# Patient Record
Sex: MALE | Race: WHITE | NOT HISPANIC OR LATINO
[De-identification: names, ages, dates, MRNs, and addresses within clinical notes are randomized per-mention and may not be internally consistent; named-entity substitution may affect disease eponyms.]

---

## 2022-03-17 PROBLEM — Z00.00 ENCOUNTER FOR PREVENTIVE HEALTH EXAMINATION: Status: ACTIVE | Noted: 2022-03-17

## 2022-04-14 ENCOUNTER — APPOINTMENT (OUTPATIENT)
Dept: OTOLARYNGOLOGY | Facility: CLINIC | Age: 47
End: 2022-04-14
Payer: COMMERCIAL

## 2022-04-14 VITALS
TEMPERATURE: 96.3 F | SYSTOLIC BLOOD PRESSURE: 100 MMHG | HEIGHT: 69 IN | BODY MASS INDEX: 25.92 KG/M2 | WEIGHT: 175 LBS | HEART RATE: 62 BPM | DIASTOLIC BLOOD PRESSURE: 69 MMHG

## 2022-04-14 PROCEDURE — 31231 NASAL ENDOSCOPY DX: CPT

## 2022-04-14 PROCEDURE — 99204 OFFICE O/P NEW MOD 45 MIN: CPT | Mod: 25

## 2022-04-14 PROCEDURE — 95004 PERQ TESTS W/ALRGNC XTRCS: CPT

## 2022-04-14 RX ORDER — FINASTERIDE 1 MG/1
1 TABLET ORAL
Refills: 0 | Status: ACTIVE | COMMUNITY

## 2022-04-14 RX ORDER — MELOXICAM 15 MG/1
TABLET ORAL
Refills: 0 | Status: ACTIVE | COMMUNITY

## 2022-04-14 NOTE — PHYSICAL EXAM
[Normal] : no abnormal secretions [de-identified] : + vidal- nasal valve collapse, + overprojection dorsally,

## 2022-04-14 NOTE — PROCEDURE
[FreeTextEntry6] : Procedure performed: Nasal Endoscopy- Diagnostic\par Pre-op indication(s): nasal congestion\par Post-op indication(s): nasal congestion \par Verbal and/or written consent obtained from patient\par Anterior rhinoscopy insufficient to account for symptoms\par Scope #: 3,  flexible fiber optic telescope \par The scope was introduced in the nasal passage between the middle and inferior turbinates to exam the inferior portion of the middle meatus and the fontanelle, as well as the maxillary ostia.  Next, the scope was passed medically and posteriorly to the middle turbinates to examine the sphenoethmoid recess and the superior turbinate region.\par Upon visualization the finders are as follows:\par Nasal Septum: sigmoidal septal deviation, + caudal deflection to the right, and posterior deflection L, + nasal valve collapse \par Bilateral - Mucosa: boggy turbinates, Mucous: scant, Polyp: not seen, Inferior Turbinate: boggy, Middle Turbinate: moderate BITH, Superior Turbinate: normal, Inferior Meatus: narrow, Middle Meatus: narrow, Super Meatus:normal, Sphenoethmoidal Recess: clear\par

## 2022-04-14 NOTE — END OF VISIT
[FreeTextEntry3] : I personally saw and examined ZAINA BERMUDEZ in detail. I spoke to LARRY Aceves regarding the assessment and plan of care.  I preformed the procedures and I reviewed the above assessment and plan of care, and agree. I have made changes in changes in the body of the note where appropriate.\par \par

## 2022-04-14 NOTE — ASSESSMENT
[FreeTextEntry1] : 47 y/o M with Nasal congestion with septal deviation and moderate bilateral turbinate hypertrophy. Also on exam caudal deflection, and nasal valve collapse. \par pt s/p medical management without any relief\par -  Flonase. A topical steroid reduce mucosal swelling, illustrated appropriate use and how to reduce the risk of bleeding \par - Nasal irrigation and showed how to use it to maximize effectiveness \par - Allergy test performed. Counseled patient at length on pathophysiology all allergies and techniques for avoidance. handouts given.\par Risks benefits and alternatives to septonasal reconstruction and bilateral inferior turbinate reduction discussed. Risks of bleeding, infection, septal hematoma, injury to the skull base, septal perforation results in whistling, permanent numbness in and around their nose, pain, discoloration or swelling that may persist, scarring crusting and bleeding as well as continued nasal obstruction, empty nose syndrome, cosmetic deformity, uneven-looking nose, collapse, scarring, synechiae, pollybeak deformity, rocker deformity, bone comminution, and need for revisionary surgery (patient explained that there is inherent revision rate to septorhinoplasty), osteitis, bleeding, infection, recurrent or persistent nasal deformity. Patient understood risks and would like to continue with the operation.\par will consider tongue groove, septal angle resection, dorsal reduction, tip looks ok - will see if can even out medial crura. caudal septum is into the right nasal valve and is narrow, will see if can remove and reconstruction valve area, may need strut, possible spreaders. some fullness right nasal bones - will even a it \par pics taken today \par will do simulation

## 2022-04-14 NOTE — HISTORY OF PRESENT ILLNESS
[de-identified] : 47 y/o M c/o constant b/l nasal congestion R>L x 30 years, pt states worse in the spring, and worse in the morning when he wakes up. \par pt has not been tested for allergies. \par Pt admits to trying nasal sprays, saline with temporary relief, and doesn’t resolve the sxs. \par + smell is okay not the best \par Pt denies sinus pressure, sinus infections, nasal surgeries

## 2022-06-13 ENCOUNTER — APPOINTMENT (OUTPATIENT)
Dept: OTOLARYNGOLOGY | Facility: CLINIC | Age: 47
End: 2022-06-13
Payer: COMMERCIAL

## 2022-06-13 VITALS
WEIGHT: 175 LBS | TEMPERATURE: 98.3 F | HEART RATE: 76 BPM | HEIGHT: 69 IN | DIASTOLIC BLOOD PRESSURE: 76 MMHG | SYSTOLIC BLOOD PRESSURE: 122 MMHG | BODY MASS INDEX: 25.92 KG/M2

## 2022-06-13 PROCEDURE — 31231 NASAL ENDOSCOPY DX: CPT

## 2022-06-13 PROCEDURE — 99214 OFFICE O/P EST MOD 30 MIN: CPT | Mod: 25

## 2022-06-13 NOTE — PHYSICAL EXAM
[Normal] : no abnormal secretions [de-identified] : + vidal- nasal valve collapse, + overprojection dorsally,

## 2022-06-13 NOTE — ASSESSMENT
[FreeTextEntry1] : 47 y/o M with Nasal congestion with septal deviation and moderate bilateral turbinate hypertrophy. Also on exam caudal deflection, and nasal valve collapse. \par pt s/p medical management without any relief\par -  Flonase. A topical steroid reduce mucosal swelling, illustrated appropriate use and how to reduce the risk of bleeding \par - Nasal irrigation and showed how to use it to maximize effectiveness \par - Allergy test performed. Counseled patient at length on pathophysiology all allergies and techniques for avoidance. handouts given.\par Risks benefits and alternatives to septonasal reconstruction and bilateral inferior turbinate reduction discussed. Risks of bleeding, infection, septal hematoma, injury to the skull base, septal perforation results in whistling, permanent numbness in and around their nose, pain, discoloration or swelling that may persist, scarring crusting and bleeding as well as continued nasal obstruction, empty nose syndrome, cosmetic deformity, uneven-looking nose, collapse, scarring, synechiae, pollybeak deformity, rocker deformity, bone comminution, and need for revisionary surgery (patient explained that there is inherent revision rate to septorhinoplasty), osteitis, bleeding, infection, recurrent or persistent nasal deformity. Patient understood risks and would like to continue with the operation.\par will consider tongue groove with mucosal resection, septal angle resection- and take off a bit of the caudal septum, dorsal reduction, tip looks ok - will see if can even out medial crura. caudal septum is into the right nasal valve and is narrow, will see if can remove and reconstruction valve area, may need strut, possible spreaders. some fullness right nasal bones - will even a it \par Simulation reviewed today

## 2022-06-13 NOTE — CONSULT LETTER
[FreeTextEntry1] : Dear Dr. MELLY POLLARD \par I had the pleasure of evaluating your patient ZAINA BERMUDEZ, thank you for allowing us to participate in their care. please see full note detailing our visit below.\par If you have any questions, please do not hesitate to call me and I would be happy to discuss further. \par \par Armand Chambers M.D.\par Attending Physician,  \par Department of Otolaryngology - Head and Neck Surgery\par [facility] \par Office: [phone]\par Fax: [phone]\par \par

## 2022-06-13 NOTE — HISTORY OF PRESENT ILLNESS
[de-identified] : 45 y/o M c/o constant b/l nasal congestion R>L x 30 years, pt states worse in the spring, and worse in the morning when he wakes up. \par pt has not been tested for allergies. \par Pt admits to trying nasal sprays, saline with temporary relief, and doesn’t resolve the sxs. \par + smell is okay not the best \par Pt denies sinus pressure, sinus infections, nasal surgeries  [FreeTextEntry1] : Pt here for a follow up regarding nasal congestion b/l, constant. Pt states stable since last visit, and with no improvement. Pt also has been using nasal sprays with no relief.\par has been tested for allergies

## 2022-06-22 ENCOUNTER — OUTPATIENT (OUTPATIENT)
Dept: OUTPATIENT SERVICES | Facility: HOSPITAL | Age: 47
LOS: 1 days | End: 2022-06-22
Payer: COMMERCIAL

## 2022-06-22 VITALS
HEART RATE: 76 BPM | HEIGHT: 69 IN | SYSTOLIC BLOOD PRESSURE: 112 MMHG | DIASTOLIC BLOOD PRESSURE: 81 MMHG | OXYGEN SATURATION: 99 % | TEMPERATURE: 99 F | RESPIRATION RATE: 16 BRPM | WEIGHT: 177.91 LBS

## 2022-06-22 DIAGNOSIS — Z98.890 OTHER SPECIFIED POSTPROCEDURAL STATES: Chronic | ICD-10-CM

## 2022-06-22 DIAGNOSIS — Z01.818 ENCOUNTER FOR OTHER PREPROCEDURAL EXAMINATION: ICD-10-CM

## 2022-06-22 DIAGNOSIS — J34.2 DEVIATED NASAL SEPTUM: ICD-10-CM

## 2022-06-22 DIAGNOSIS — J30.2 OTHER SEASONAL ALLERGIC RHINITIS: ICD-10-CM

## 2022-06-22 LAB
ANION GAP SERPL CALC-SCNC: 10 MMOL/L — SIGNIFICANT CHANGE UP (ref 5–17)
BUN SERPL-MCNC: 14 MG/DL — SIGNIFICANT CHANGE UP (ref 7–23)
CALCIUM SERPL-MCNC: 9.9 MG/DL — SIGNIFICANT CHANGE UP (ref 8.4–10.5)
CHLORIDE SERPL-SCNC: 100 MMOL/L — SIGNIFICANT CHANGE UP (ref 96–108)
CO2 SERPL-SCNC: 27 MMOL/L — SIGNIFICANT CHANGE UP (ref 22–31)
CREAT SERPL-MCNC: 0.96 MG/DL — SIGNIFICANT CHANGE UP (ref 0.5–1.3)
EGFR: 99 ML/MIN/1.73M2 — SIGNIFICANT CHANGE UP
GLUCOSE SERPL-MCNC: 84 MG/DL — SIGNIFICANT CHANGE UP (ref 70–99)
HCT VFR BLD CALC: 50.5 % — HIGH (ref 39–50)
HGB BLD-MCNC: 16.5 G/DL — SIGNIFICANT CHANGE UP (ref 13–17)
HIV 1+2 AB+HIV1 P24 AG SERPL QL IA: SIGNIFICANT CHANGE UP
MCHC RBC-ENTMCNC: 27.9 PG — SIGNIFICANT CHANGE UP (ref 27–34)
MCHC RBC-ENTMCNC: 32.7 GM/DL — SIGNIFICANT CHANGE UP (ref 32–36)
MCV RBC AUTO: 85.4 FL — SIGNIFICANT CHANGE UP (ref 80–100)
NRBC # BLD: 0 /100 WBCS — SIGNIFICANT CHANGE UP (ref 0–0)
PLATELET # BLD AUTO: 238 K/UL — SIGNIFICANT CHANGE UP (ref 150–400)
POTASSIUM SERPL-MCNC: 5.3 MMOL/L — SIGNIFICANT CHANGE UP (ref 3.5–5.3)
POTASSIUM SERPL-SCNC: 5.3 MMOL/L — SIGNIFICANT CHANGE UP (ref 3.5–5.3)
RBC # BLD: 5.91 M/UL — HIGH (ref 4.2–5.8)
RBC # FLD: 13.4 % — SIGNIFICANT CHANGE UP (ref 10.3–14.5)
SODIUM SERPL-SCNC: 137 MMOL/L — SIGNIFICANT CHANGE UP (ref 135–145)
WBC # BLD: 9.39 K/UL — SIGNIFICANT CHANGE UP (ref 3.8–10.5)
WBC # FLD AUTO: 9.39 K/UL — SIGNIFICANT CHANGE UP (ref 3.8–10.5)

## 2022-06-22 PROCEDURE — 85027 COMPLETE CBC AUTOMATED: CPT

## 2022-06-22 PROCEDURE — 80048 BASIC METABOLIC PNL TOTAL CA: CPT

## 2022-06-22 PROCEDURE — G0463: CPT

## 2022-06-22 PROCEDURE — 87389 HIV-1 AG W/HIV-1&-2 AB AG IA: CPT

## 2022-06-22 RX ORDER — SODIUM CHLORIDE 9 MG/ML
1000 INJECTION, SOLUTION INTRAVENOUS ONCE
Refills: 0 | Status: DISCONTINUED | OUTPATIENT
Start: 2022-07-13 | End: 2022-07-27

## 2022-06-22 NOTE — H&P PST ADULT - HISTORY OF PRESENT ILLNESS
47 y/o male c/o constant b/l nasal congestion R>L x 30 years, pt states worse in the spring. Pt states he  tried nasal sprays, saline with temporary relief, and doesn’t resolve the symptoms. Denies sinus pressure, sinus infections and nasal surgeries. Today he presents to PST for scheduled Septoplasty Shay Inferior Turbinoplasty Nasal Valve Repair on 7/13/22. Denies any palpitations, SOB, N/V, fever or chills.   ***COVID swab scheduled for 7/10/22 ***

## 2022-06-22 NOTE — H&P PST ADULT - NSICDXPASTMEDICALHX_GEN_ALL_CORE_FT
PAST MEDICAL HISTORY:  2019 novel coronavirus disease (COVID-19) 4/2022 mild symptoms    Marijuana user 2 x a week    Nasal septal deviation     Nasal turbinate hypertrophy     OA (osteoarthritis)

## 2022-06-22 NOTE — H&P PST ADULT - NSICDXPASTSURGICALHX_GEN_ALL_CORE_FT
PAST SURGICAL HISTORY:  H/O cervical discectomy 2015 with fusion    S/P foot surgery 2018 Right h/o plantar fasciitis

## 2022-06-22 NOTE — H&P PST ADULT - PROBLEM SELECTOR PLAN 1
Septoplasty Shay Inferior Turbinoplasty Nasal Valve Repair on 7/13/22  Pre-op education provided - all questions answered. Pt verbalized understanding

## 2022-06-22 NOTE — H&P PST ADULT - FALL HARM RISK - UNIVERSAL INTERVENTIONS
Bed in lowest position, wheels locked, appropriate side rails in place/Call bell, personal items and telephone in reach/Instruct patient to call for assistance before getting out of bed or chair/Non-slip footwear when patient is out of bed/[place] to call system/Physically safe environment - no spills, clutter or unnecessary equipment/Purposeful Proactive Rounding/Room/bathroom lighting operational, light cord in reach

## 2022-06-27 ENCOUNTER — TRANSCRIPTION ENCOUNTER (OUTPATIENT)
Age: 47
End: 2022-06-27

## 2022-06-28 PROBLEM — F12.90 CANNABIS USE, UNSPECIFIED, UNCOMPLICATED: Chronic | Status: ACTIVE | Noted: 2022-06-22

## 2022-06-28 PROBLEM — M19.90 UNSPECIFIED OSTEOARTHRITIS, UNSPECIFIED SITE: Chronic | Status: ACTIVE | Noted: 2022-06-22

## 2022-06-28 PROBLEM — J34.3 HYPERTROPHY OF NASAL TURBINATES: Chronic | Status: ACTIVE | Noted: 2022-06-22

## 2022-06-28 PROBLEM — J34.2 DEVIATED NASAL SEPTUM: Chronic | Status: ACTIVE | Noted: 2022-06-22

## 2022-06-28 PROBLEM — U07.1 COVID-19: Chronic | Status: ACTIVE | Noted: 2022-06-22

## 2022-07-10 ENCOUNTER — OUTPATIENT (OUTPATIENT)
Dept: OUTPATIENT SERVICES | Facility: HOSPITAL | Age: 47
LOS: 1 days | End: 2022-07-10
Payer: COMMERCIAL

## 2022-07-10 DIAGNOSIS — Z11.52 ENCOUNTER FOR SCREENING FOR COVID-19: ICD-10-CM

## 2022-07-10 DIAGNOSIS — Z98.890 OTHER SPECIFIED POSTPROCEDURAL STATES: Chronic | ICD-10-CM

## 2022-07-10 LAB — SARS-COV-2 RNA SPEC QL NAA+PROBE: SIGNIFICANT CHANGE UP

## 2022-07-10 PROCEDURE — U0005: CPT

## 2022-07-10 PROCEDURE — U0003: CPT

## 2022-07-10 PROCEDURE — C9803: CPT

## 2022-07-12 ENCOUNTER — TRANSCRIPTION ENCOUNTER (OUTPATIENT)
Age: 47
End: 2022-07-12

## 2022-07-13 ENCOUNTER — TRANSCRIPTION ENCOUNTER (OUTPATIENT)
Age: 47
End: 2022-07-13

## 2022-07-13 ENCOUNTER — OUTPATIENT (OUTPATIENT)
Dept: OUTPATIENT SERVICES | Facility: HOSPITAL | Age: 47
LOS: 1 days | End: 2022-07-13
Payer: COMMERCIAL

## 2022-07-13 ENCOUNTER — RESULT REVIEW (OUTPATIENT)
Age: 47
End: 2022-07-13

## 2022-07-13 ENCOUNTER — APPOINTMENT (OUTPATIENT)
Dept: OTOLARYNGOLOGY | Facility: HOSPITAL | Age: 47
End: 2022-07-13

## 2022-07-13 VITALS
DIASTOLIC BLOOD PRESSURE: 70 MMHG | OXYGEN SATURATION: 98 % | HEART RATE: 66 BPM | RESPIRATION RATE: 18 BRPM | HEIGHT: 69 IN | SYSTOLIC BLOOD PRESSURE: 116 MMHG | TEMPERATURE: 98 F | WEIGHT: 177.91 LBS

## 2022-07-13 VITALS
HEART RATE: 76 BPM | OXYGEN SATURATION: 97 % | SYSTOLIC BLOOD PRESSURE: 136 MMHG | DIASTOLIC BLOOD PRESSURE: 79 MMHG | RESPIRATION RATE: 14 BRPM

## 2022-07-13 DIAGNOSIS — Z98.890 OTHER SPECIFIED POSTPROCEDURAL STATES: Chronic | ICD-10-CM

## 2022-07-13 DIAGNOSIS — J30.2 OTHER SEASONAL ALLERGIC RHINITIS: ICD-10-CM

## 2022-07-13 PROCEDURE — 88300 SURGICAL PATH GROSS: CPT | Mod: 26

## 2022-07-13 PROCEDURE — 88300 SURGICAL PATH GROSS: CPT

## 2022-07-13 PROCEDURE — 30520 REPAIR OF NASAL SEPTUM: CPT | Mod: 58

## 2022-07-13 PROCEDURE — 30930 THER FX NASAL INF TURBINATE: CPT

## 2022-07-13 PROCEDURE — 30465 REPAIR NASAL STENOSIS: CPT

## 2022-07-13 PROCEDURE — C9399: CPT

## 2022-07-13 PROCEDURE — 20912 REMOVE CARTILAGE FOR GRAFT: CPT | Mod: 58,59

## 2022-07-13 PROCEDURE — 30140 RESECT INFERIOR TURBINATE: CPT | Mod: 50,58

## 2022-07-13 PROCEDURE — 30520 REPAIR OF NASAL SEPTUM: CPT

## 2022-07-13 PROCEDURE — 30465 REPAIR NASAL STENOSIS: CPT | Mod: 58

## 2022-07-13 RX ORDER — LIDOCAINE HCL 20 MG/ML
0.2 VIAL (ML) INJECTION ONCE
Refills: 0 | Status: DISCONTINUED | OUTPATIENT
Start: 2022-07-13 | End: 2022-07-13

## 2022-07-13 RX ORDER — OXYCODONE HYDROCHLORIDE 5 MG/1
5 TABLET ORAL ONCE
Refills: 0 | Status: DISCONTINUED | OUTPATIENT
Start: 2022-07-13 | End: 2022-07-13

## 2022-07-13 RX ORDER — SODIUM CHLORIDE 9 MG/ML
1000 INJECTION INTRAMUSCULAR; INTRAVENOUS; SUBCUTANEOUS
Refills: 0 | Status: DISCONTINUED | OUTPATIENT
Start: 2022-07-13 | End: 2022-07-27

## 2022-07-13 RX ORDER — SODIUM CHLORIDE 9 MG/ML
3 INJECTION INTRAMUSCULAR; INTRAVENOUS; SUBCUTANEOUS EVERY 8 HOURS
Refills: 0 | Status: DISCONTINUED | OUTPATIENT
Start: 2022-07-13 | End: 2022-07-13

## 2022-07-13 RX ORDER — FINASTERIDE 5 MG/1
1 TABLET, FILM COATED ORAL
Qty: 0 | Refills: 0 | DISCHARGE

## 2022-07-13 RX ORDER — FENTANYL CITRATE 50 UG/ML
12.5 INJECTION INTRAVENOUS
Refills: 0 | Status: DISCONTINUED | OUTPATIENT
Start: 2022-07-13 | End: 2022-07-13

## 2022-07-13 RX ORDER — ONDANSETRON 8 MG/1
4 TABLET, FILM COATED ORAL ONCE
Refills: 0 | Status: DISCONTINUED | OUTPATIENT
Start: 2022-07-13 | End: 2022-07-13

## 2022-07-13 RX ORDER — FLUTICASONE PROPIONATE 50 MCG
1 SPRAY, SUSPENSION NASAL
Qty: 15.8 | Refills: 0
Start: 2022-07-13 | End: 2022-07-26

## 2022-07-13 RX ADMIN — FENTANYL CITRATE 12.5 MICROGRAM(S): 50 INJECTION INTRAVENOUS at 16:44

## 2022-07-13 RX ADMIN — FENTANYL CITRATE 12.5 MICROGRAM(S): 50 INJECTION INTRAVENOUS at 15:22

## 2022-07-13 RX ADMIN — FENTANYL CITRATE 12.5 MICROGRAM(S): 50 INJECTION INTRAVENOUS at 17:15

## 2022-07-13 RX ADMIN — SODIUM CHLORIDE 75 MILLILITER(S): 9 INJECTION INTRAMUSCULAR; INTRAVENOUS; SUBCUTANEOUS at 16:00

## 2022-07-13 RX ADMIN — FENTANYL CITRATE 12.5 MICROGRAM(S): 50 INJECTION INTRAVENOUS at 15:52

## 2022-07-13 NOTE — ASU DISCHARGE PLAN (ADULT/PEDIATRIC) - NS MD DC FALL RISK RISK
For information on Fall & Injury Prevention, visit: [email or web address] OR  [email or web address] OR  https://www.cdc.gov/steadi/patient.html

## 2022-07-13 NOTE — ASU DISCHARGE PLAN (ADULT/PEDIATRIC) - CARE PROVIDER_API CALL
Armand Chambers (MD)  Otolaryngology  [address] [place] [address], [facility] [address]  [place] [address]  Phone: [phone]  Fax: [phone]  Follow Up Time:

## 2022-07-13 NOTE — ASU DISCHARGE PLAN (ADULT/PEDIATRIC) - ASU DC SPECIAL INSTRUCTIONSFT
- No nose blowing for 2 weeks, cough/sneeze through an open mouth   - No straining for two weeks  - Complete antibiotics as prescribed  - Pain medication as needed - do not drive, make decisions or operate machinery on pain meds. if constipates take stool softener, do not strain.   - start using nasal wash tomorrow, 2-3 times a day if possible  - follow up with Dr. Chambers in 1-2 weeks  - expect some bloody oozing from the nose for the first week, put a gauze under to catch it. May use afrin if needed - No nose blowing for 2 weeks, cough/sneeze through an open mouth   - No straining for two weeks  - Complete antibiotics as prescribed  - Pain medication as needed - do not drive, make decisions or operate machinery on pain meds. if constipates take stool softener, do not strain.   - may take ibuprofen or tylenol as needed - not with Percocet   - start using nasal wash tomorrow, 2-3 times a day if possible  - follow up with Dr. Chambers in 1-2 weeks  - expect some bloody oozing from the nose for the first week, put a gauze under to catch it. May use afrin if needed

## 2022-07-13 NOTE — ASU PATIENT PROFILE, ADULT - FALL HARM RISK - UNIVERSAL INTERVENTIONS
Bed in lowest position, wheels locked, appropriate side rails in place/Call bell, personal items and telephone in reach/Instruct patient to call for assistance before getting out of bed or chair/Non-slip footwear when patient is out of bed/[place] to call system/Physically safe environment - no spills, clutter or unnecessary equipment/Purposeful Proactive Rounding/Room/bathroom lighting operational, light cord in reach

## 2022-07-13 NOTE — PRE-ANESTHESIA EVALUATION ADULT - NSANTHPMHFT_GEN_ALL_CORE
46M hx osteoarthritis, nasal turbinate hypertrophy and marijuana use, PSH 2015 cervical discectomy w/ fusion and 2018 foot surgery p/w seasonal allergic rhinitis, here for septoplasty, bilateral inferior turbinoplasty, nasal valve repair.

## 2022-07-14 RX ORDER — AMOXICILLIN AND CLAVULANATE POTASSIUM 400; 57 MG/5ML; MG/5ML
400-57 POWDER, FOR SUSPENSION ORAL
Qty: 2.5 | Refills: 0 | Status: ACTIVE | COMMUNITY
Start: 2022-07-14 | End: 1900-01-01

## 2022-07-18 ENCOUNTER — APPOINTMENT (OUTPATIENT)
Dept: OTOLARYNGOLOGY | Facility: CLINIC | Age: 47
End: 2022-07-18

## 2022-07-18 VITALS
WEIGHT: 170 LBS | HEIGHT: 69 IN | TEMPERATURE: 97.6 F | SYSTOLIC BLOOD PRESSURE: 116 MMHG | BODY MASS INDEX: 25.18 KG/M2 | HEART RATE: 88 BPM | DIASTOLIC BLOOD PRESSURE: 86 MMHG

## 2022-07-18 PROCEDURE — 99024 POSTOP FOLLOW-UP VISIT: CPT

## 2022-07-18 PROCEDURE — 31237 NSL/SINS NDSC SURG BX POLYPC: CPT | Mod: 50,58

## 2022-07-18 RX ORDER — OXYCODONE AND ACETAMINOPHEN 5; 325 MG/1; MG/1
5-325 TABLET ORAL
Qty: 18 | Refills: 0 | Status: ACTIVE | COMMUNITY
Start: 2022-07-13

## 2022-07-18 RX ORDER — AMOXICILLIN AND CLAVULANATE POTASSIUM 875; 125 MG/1; MG/1
875-125 TABLET, COATED ORAL
Qty: 20 | Refills: 0 | Status: ACTIVE | COMMUNITY
Start: 2022-07-13

## 2022-07-18 NOTE — REASON FOR VISIT
[Post-Operative Visit] : a post-operative visit [FreeTextEntry2] : s/p septoplasty, bilateral inferior turbinoplasty, nasal valve repair 07/13/22

## 2022-07-18 NOTE — ASSESSMENT
[FreeTextEntry1] : 45 y/o M with Nasal congestion with septal deviation and moderate bilateral turbinate hypertrophy. Also on exam caudal deflection, and nasal valve collapse. \par pt s/p medical management without any relief\par Pt now s/p septoplasty, bilateral inferior turbinoplasty, nasal valve repair 07/13/22, debrided today, splints still in place, will remove next visit\par The patient was debrided bilaterally with rigid suction as a result of nasal crusting. breathing much improved after detriment. Patient should continue to avoid nose blowing, heavy lifting or exercising, and should start a regimen of over the counter nasal saline spray for moisturization of the nasal cavities\par will follow up to assure no scarring and keep sinuses open\par - Continue peroxide and nasal irrigations

## 2022-07-18 NOTE — HISTORY OF PRESENT ILLNESS
[de-identified] : 46 year old s/p septoplasty, bilateral inferior turbinoplasty, nasal valve repair 07/13/22\par Presents today for post operative evaluation.\par Today with swelling,and bruising.\par Reports small amount of blood and congestion.\par Denies fever, clear rhinorrhea and pain.\par Taking antibiotics BID. No longer needs Percocet. \par Using peroxide and irrigation 5+ times daily.

## 2022-07-18 NOTE — PHYSICAL EXAM
[Normal] : inferior turbinates and middle turbinates are normal [de-identified] : splints in place [de-identified] : crusting

## 2022-07-18 NOTE — PROCEDURE
[FreeTextEntry3] : procedure - bilateral endoscopic nasal  debridement\par Bilateral nasal cavities inspected with #0 ridged sinus endoscope. septum appeared midline and turbinates well reduced. bilateral middle meatuses were explored and suction and agitator were used to open ostiums and open any forming scar bands. all sinuses were explored and open at end of procedure\par nasal crusting cleared, splints in place

## 2022-07-21 ENCOUNTER — APPOINTMENT (OUTPATIENT)
Dept: OTOLARYNGOLOGY | Facility: CLINIC | Age: 47
End: 2022-07-21

## 2022-07-21 VITALS
TEMPERATURE: 97.9 F | WEIGHT: 170 LBS | HEIGHT: 69 IN | BODY MASS INDEX: 25.18 KG/M2 | SYSTOLIC BLOOD PRESSURE: 118 MMHG | DIASTOLIC BLOOD PRESSURE: 85 MMHG | HEART RATE: 98 BPM

## 2022-07-21 PROCEDURE — 99024 POSTOP FOLLOW-UP VISIT: CPT

## 2022-07-21 PROCEDURE — 31237 NSL/SINS NDSC SURG BX POLYPC: CPT | Mod: 50,58

## 2022-07-21 NOTE — END OF VISIT
[FreeTextEntry3] : I personally saw and examined JASON TSAI in detail. I spoke to Debbie ANDERSON regarding the assessment and plan of care.  I preformed the procedures and I reviewed the above assessment and plan of care, and agree. I have made changes in changes in the body of the note where appropriate.\par

## 2022-07-21 NOTE — ASSESSMENT
[FreeTextEntry1] : 46 year old with long history of b/l nasal congestion with septal deviation and moderate bilateral turbinate hypertrophy. Also on exam caudal deflection, and nasal valve collapse. \par Now s/p septoplasty, bilateral inferior turbinoplasty, nasal valve repair 07/13/22.\par The patient was debrided bilaterally with rigid suction as a result of nasal crusting. breathing much improved after detriment. Patient should continue to avoid nose blowing, heavy lifting or exercising, and should start a regimen of over the counter nasal saline spray for moisturization of the nasal cavities. suture and splints removed with improved breathing. Patient happy with results thus far \par will follow up to assure no scarring and keep sinuses open\par - Continue  nasal irrigations \par - flonase\par - nasal precautions

## 2022-07-21 NOTE — PHYSICAL EXAM
[Normal] : inferior turbinates and middle turbinates are normal [de-identified] : splints in plac [de-identified] : crusting

## 2022-07-21 NOTE — CONSULT LETTER
[FreeTextEntry1] : Dear Dr. MELLY POLLARD \par I had the pleasure of evaluating your patient ZAINA BERMUDEZ, thank you for allowing us to participate in their care. please see full note detailing our visit below.\par If you have any questions, please do not hesitate to call me and I would be happy to discuss further. \par \par Armand Chambers M.D.\par Attending Physician,  \par Department of Otolaryngology - Head and Neck Surgery\par [facility] \par Office: [phone]\par Fax: [phone]\par

## 2022-07-21 NOTE — HISTORY OF PRESENT ILLNESS
[de-identified] : 46 year old s/p septoplasty, bilateral inferior turbinoplasty, nasal valve repair 07/13/22\par Presents today for post operative evaluation.\par s/p debridement with improved breathing 07/18/22.\par Today splints in place still with mild swelling,and bruising.\par Denies fever, clear rhinorrhea and pain.\par Using peroxide and irrigation 5+ times daily.

## 2022-07-21 NOTE — PROCEDURE
[FreeTextEntry3] : procedure - bilateral endoscopic nasal debridement\par Bilateral nasal cavities inspected with #0 ridged sinus endoscope. septum appeared midline and turbinates well reduced. bilateral middle meatuses were explored and suction and agitator were used to open ostiums and open any forming scar bands. all sinuses were explored and open at end of procedure\par nasal crusting cleared, suture and splints removed

## 2022-08-11 ENCOUNTER — APPOINTMENT (OUTPATIENT)
Dept: OTOLARYNGOLOGY | Facility: CLINIC | Age: 47
End: 2022-08-11

## 2022-08-11 VITALS
DIASTOLIC BLOOD PRESSURE: 79 MMHG | TEMPERATURE: 98.5 F | WEIGHT: 170 LBS | SYSTOLIC BLOOD PRESSURE: 114 MMHG | HEIGHT: 69 IN | HEART RATE: 71 BPM | BODY MASS INDEX: 25.18 KG/M2

## 2022-08-11 DIAGNOSIS — J34.3 HYPERTROPHY OF NASAL TURBINATES: ICD-10-CM

## 2022-08-11 DIAGNOSIS — J34.89 OTHER SPECIFIED DISORDERS OF NOSE AND NASAL SINUSES: ICD-10-CM

## 2022-08-11 PROCEDURE — 99024 POSTOP FOLLOW-UP VISIT: CPT

## 2022-08-11 PROCEDURE — 31237 NSL/SINS NDSC SURG BX POLYPC: CPT | Mod: 50,58

## 2022-08-11 NOTE — REASON FOR VISIT
[Subsequent Evaluation] : a subsequent evaluation for [FreeTextEntry2] : s/p septoplasty, bilateral inferior turbinoplasty, nasal valve repair, rhino  07/13/22

## 2022-08-11 NOTE — CONSULT LETTER
[FreeTextEntry1] : Dear Dr. MELLY POLLARD \par I had the pleasure of evaluating your patient ZAINA BERMUDEZ, thank you for allowing us to participate in their care. please see full note detailing our visit below.\par If you have any questions, please do not hesitate to call me and I would be happy to discuss further. \par \par Armand Chambers M.D.\par Attending Physician,  \par Department of Otolaryngology - Head and Neck Surgery\par [facility] \par Office: [phone]\par Fax: [phone]\par \par

## 2022-08-11 NOTE — PHYSICAL EXAM
[Nasal Endoscopy Performed] : nasal endoscopy was performed, see procedure section for findings [Normal] : inferior turbinates and middle turbinates are normal

## 2022-08-11 NOTE — END OF VISIT
[FreeTextEntry3] : I personally saw and examined ZAINA BERMUDEZ in detail.  I spoke to RDAHA Quinones regarding the assessment and plan of care. I performed the procedures and relevant physical exam.  I have reviewed the above assessment and plan of care and I agree.  I have made changes to the body of the note wherever necessary and appropriate.\par

## 2022-08-11 NOTE — PROCEDURE
[FreeTextEntry3] : procedure - bilateral endoscopic nasal debridement\par Bilateral nasal cavities inspected with #0 ridged sinus endoscope. septum appeared midline and turbinates well reduced. bilateral middle meatuses were explored and suction and agitator were used to open ostiums and open any forming scar bands. all sinuses were explored and open at end of procedure\par nasal crusting cleared

## 2022-08-11 NOTE — HISTORY OF PRESENT ILLNESS
[de-identified] : 46 year old s/p septoplasty, bilateral inferior turbinoplasty, nasal valve repair, rhino  07/13/22\par Presents today for post operative evaluation.\par s/p debridement with improved breathing 07/18/22.\par  [FreeTextEntry1] : pt doing very well today, breathing well bilaterally using saline nasal rinse daily and peroxide on qtips, pt feels the swelling has gone down, does feel swelling over the nasal valve area b/l

## 2022-08-11 NOTE — ASSESSMENT
[FreeTextEntry1] : 46 year old with long history of b/l nasal congestion with septal deviation and moderate bilateral turbinate hypertrophy. Also on exam caudal deflection, and nasal valve collapse. \par Now s/p septoplasty, bilateral inferior turbinoplasty, nasal valve repair, Rhino 07/13/22.\par The patient was debrided bilaterally with rigid suction as a result of nasal crusting. breathing much improved after detriment. Patient should continue to avoid nose blowing, heavy lifting or exercising, and should start a regimen of over the counter nasal saline spray for moisturization of the nasal cavities. suture and splints removed with improved breathing. Patient happy with results thus far \par - Continue  nasal irrigations \par - flonase\par - nasal precautions \par very very happy with result of surgery - breathing and appearance \par \par \par \par

## 2022-09-12 NOTE — CONSULT LETTER
[FreeTextEntry1] : Dear Dr. MELLY POLLARD \par I had the pleasure of evaluating your patient ZAINA BERMUDEZ, thank you for allowing us to participate in their care. please see full note detailing our visit below.\par If you have any questions, please do not hesitate to call me and I would be happy to discuss further. \par \par Armand Chambers M.D.\par Attending Physician,  \par Department of Otolaryngology - Head and Neck Surgery\par [facility] \par Office: [phone]\par Fax: [phone]\par  
Attending with

## 2022-09-26 LAB — SURGICAL PATHOLOGY STUDY: SIGNIFICANT CHANGE UP

## 2022-09-27 ENCOUNTER — NON-APPOINTMENT (OUTPATIENT)
Age: 47
End: 2022-09-27

## 2022-10-03 ENCOUNTER — APPOINTMENT (OUTPATIENT)
Dept: OTOLARYNGOLOGY | Facility: CLINIC | Age: 47
End: 2022-10-03

## 2022-10-03 VITALS
WEIGHT: 175 LBS | HEIGHT: 69 IN | DIASTOLIC BLOOD PRESSURE: 82 MMHG | HEART RATE: 62 BPM | BODY MASS INDEX: 25.92 KG/M2 | SYSTOLIC BLOOD PRESSURE: 114 MMHG | TEMPERATURE: 97.9 F

## 2022-10-03 DIAGNOSIS — R09.81 NASAL CONGESTION: ICD-10-CM

## 2022-10-03 DIAGNOSIS — J34.2 DEVIATED NASAL SEPTUM: ICD-10-CM

## 2022-10-03 DIAGNOSIS — J30.2 OTHER SEASONAL ALLERGIC RHINITIS: ICD-10-CM

## 2022-10-03 DIAGNOSIS — R06.83 SNORING: ICD-10-CM

## 2022-10-03 DIAGNOSIS — Z98.890 OTHER SPECIFIED POSTPROCEDURAL STATES: ICD-10-CM

## 2022-10-03 PROCEDURE — 99213 OFFICE O/P EST LOW 20 MIN: CPT | Mod: 25

## 2022-10-03 PROCEDURE — 31231 NASAL ENDOSCOPY DX: CPT | Mod: 58

## 2022-10-03 NOTE — END OF VISIT
[FreeTextEntry3] : I personally saw and examined ZAINA BERMUDEZ in detail.  I spoke to RADHA Quinones regarding the assessment and plan of care. I performed the procedures and relevant physical exam.  I have reviewed the above assessment and plan of care and I agree.  I have made changes to the body of the note wherever necessary and appropriate.\par

## 2022-10-03 NOTE — CONSULT LETTER
[FreeTextEntry1] : Dear Dr. MELLY POLLARD \par I had the pleasure of evaluating your patient ZAINA BERMUDEZ, thank you for allowing us to participate in their care. please see full note detailing our visit below.\par If you have any questions, please do not hesitate to call me and I would be happy to discuss further. \par \par Armand Chambers M.D.\par Attending Physician,  \par Department of Otolaryngology - Head and Neck Surgery\par [facility] \par Office: [phone]\par Fax: [phone]\par \par

## 2022-10-03 NOTE — ASSESSMENT
[FreeTextEntry1] : Mr. BERMUDEZ is a 46 year male s/p septoplasty, bilateral inferior turbinoplasty, nasal valve repair, Rhino 07/13/22, doing well, has started snoring again last few days but with no signs of apnea per wife. On exam his septum is midline and he is healing well, NP and nose normal on scope today\par \par - photos done today\par - Continue nasal irrigations \par - would follow the snoring as his nose and NP look great today, pt informed alcohol can induce snoring as well, will continue to follow\par - f/u 1 month\par very very happy with result of surgery - breathing and appearance \par questions answered \par \par

## 2022-10-03 NOTE — HISTORY OF PRESENT ILLNESS
[de-identified] : 46 year old s/p septoplasty, bilateral inferior turbinoplasty, nasal valve repair, rhino  07/13/22\par s/p debridement with improved breathing 07/18/22.\par  [FreeTextEntry1] : using nasal rinses daily, feels he is healing well,  occ nasal congestion, wife states he has started snoring again lst few days - has been drinking ETOH, he is mouth closed and he is moving air and not waking up

## 2022-10-03 NOTE — PROCEDURE
[[email or web address]] : Procedure performed: Nasal Endoscopy- Diagnostic\par Pre-op indication(s): nasal congestion\par Post-op indication(s): nasal congestion \par Verbal and/or written consent obtained from patient\par Anterior rhinoscopy insufficient to account for symptoms\par Scope #: 3,  flexible fiber optic telescope \par The scope was introduced in the nasal passage between the middle and inferior turbinates to exam the inferior portion of the middle meatus and the fontanelle, as well as the maxillary ostia.  Next, the scope was passed medically and posteriorly to the middle turbinates to examine the sphenoethmoid recess and the superior turbinate region.\par Upon visualization the finders are as follows:\par Nasal Septum: midline\par Bilateral - Mucosa: boggy turbinates, Mucous: scant, Polyp: not seen, Inferior Turbinate: boggy, Middle Turbinate: normal, Superior Turbinate: normal, Inferior Meatus: narrow, Middle Meatus: narrow, Super Meatus:normal, Sphenoethmoidal Recess: clear\par  [de-identified] : Procedure performed: laryngeal Endoscopy- Diagnostic\par Pre-op/post op indication: snoring\par Verbal and/or written consent obtained from patient, Patient was unable to cooperate with mirror\par Scope #: 3, flexible fiber optic telescope used \par Scope was introduced through the nose passed on the floor of the nose to the nasopharynx and then followed down the soft palate to the lower pharynx. The tongue Base, Larynx, Hypopharynx were examined. Base of tongue was symmetric, vallecular was clear, epiglottis was not deformed, subglottis/ pyriform and posterior pharyngeal walls were clear. No erythema, edema, pooling of secretions, masses or lesions. Airway patent, no foreign body visualized. No glottic/supraglottic edema. True vocal cords, arytenoids, vestibular folds, ventricles, pyriform sinuses, and aryepiglottic folds appear normal bilaterally. Vocal cords mobile with good contact b/l.\par

## 2025-03-10 NOTE — H&P PST ADULT - DATE OF FIRST COVID-19 BOOSTER
Head, normocephalic, atraumatic, Face, Face within normal limits, Ears, External ears within normal limits
07-Dec-2021
